# Patient Record
Sex: FEMALE | Race: WHITE | Employment: OTHER | URBAN - METROPOLITAN AREA
[De-identification: names, ages, dates, MRNs, and addresses within clinical notes are randomized per-mention and may not be internally consistent; named-entity substitution may affect disease eponyms.]

---

## 2024-07-17 NOTE — PROGRESS NOTES
Urogynecology  Provider:  Miladys De Souza MD  [phone]      ASSESSMENT AND PLAN:   69 year old female with stage 3-4 anterior wall predominant uterovaginal prolapse. Comorbidities include: Mohs defect.     Diagnoses:  #1 Uterovaginal prolapse with predominant cystocele, stage 3-4  #2 Incomplete bladder emptying secondary to pelvic organ prolapse    Plan:  1. Uterovaginal prolapse with predominant cystocele, stage 3-4  - She has had POP over the past x5 years and was previously fitted with a pessary by another provider that fell out. Upon pelvic exam today Pop-Q demonstrated Aa: +6, C: +6, TVL: 10, and D: 0.   - Reviewed risk factors, natural history and etiology of prolapse.   - Discussed management options for prolapse including expectant management, PFPT, pessary fitting, and surgery.   - We discussed that PFPT would help strengthen the PF muscles with an overall goal to help improve the symptoms of the vaginal bulge and prevent POP from worsening over time.   - We discussed the benefits of fitting her with a pessary which is a small flexible silicone ring that is placed intravaginally to help support the pelvic organs to prevent the symptomatic vaginal bulge. There is no increased risk of UTI or vaginal infections with using a pessary. She may learn how to manage the pessary at home on her own with taking the pessary in/out prior to intercourse and she may return to clinic ever 3 months for us to perform her pessary maintenance.   - We discussed there are different POP repair surgical approaches to consider given that she wishes to maintain the ability to be sexually active in the future such as robotic assisted abdominal surgery that involves placing polypropylene mesh (i.e. SCP + ENRIQUETA) or a native tissue repair vaginal approach that does not involve mesh but rather uses sutures with the intent of the body to scar over to suspend the pelvic organs that can be performed with or without a hysterectomy (i.e.  USLS +/- TVH and APR). If she has no preference in surgery type she may consider enrolling in the Premier trial. We would defer GONZÁLEZ preventative procedure at the time of surgery as she has urinary retention at baseline. For either surgery, it involves 6 weeks of postoperative pelvic rest and no heavy lifting > 5-10 pounds.   - She patient is interested in pursuing a pessary fitting but will consider POP repair in the future.   - Fitted her with a #2 3/4 LS Gellhorn pessary that remained in place with valsalva/bending over and was comfortable. She will return to clinic every 3 months for pessary checks.     - Prolonged conversation with pt and  today to discuss my findings and options for surgery including TVH/USLS or ENRIQUETA/SCPXY or colocleisis. She would like to be sexually active so colpocleisis not appropriate. For now will proceed with pessary but will consider surgery in future. Has never had surgery so anxious about this.    2. Incomplete bladder emptying, urinary retention   - PVR = 121mL by bladder U/S after pessary was fitted today. Of note, prior to fitting her with a pessary her PVR was >300mL.     Follow up in 2 weeks to see Kimberlee BATES-CNP @ [facility] for a newly fitted pessary check and then q3 months pessary checks.     Scribe Attestation  By signing my name below, I, Javed Nath, Evert[employer], attest that this documentation has been prepared under the direction and in the presence of Miladys De Souza MD on 07/18/2024 at 7:09 PM.     Agree with above. I Dr. De Souza, personally performed the services described in the documentation which was scribed virtually and confirm it is both complete and accurate.  Miladys De Souza MD      Problem List Items Addressed This Visit    None          I spent a total of 55 minutes in face to face and non face to face time.      Miladys De Souza MD    Sent in consultation by Dr. Navarro. PVR after pessary placed = 121 mL      HISTORY OF PRESENT ILLNESS:    69 year old female presenting as a new patient referral from Dr. Navarro for evaluation of vaginal prolapse.     Prolapse Symptoms:  - She reports having POP and was fitted with a pessary x5 years ago but the pessary fell out after having a BM in which she could not retreive her pessary.   - Her vaginal prolapse comes past the introitus and is tennis ball sized.      Urinary Symptoms:   - The patient endorses urinary frequency due to the large size of her POP causing incomplete bladder emptying.   - Does have mild and rare GONZÁLEZ leakage at baseline with hard coughing or hard sneezing.      OBGYN History and Sexual Activity:   -  x4   - She is not currently sexually active and does wish to return to intercourse in the future.   - No prior hx of abnormal pap smears.        Past Medical History:     No past medical history on file.       Past Surgical History:     No past surgical history on file.      Medications:     Prior to Admission medications    Not on File        ROS  Review of Systems   Constitutional: Negative.    HENT: Negative.     Eyes: Negative.    Respiratory: Negative.     Cardiovascular: Negative.    Gastrointestinal: Negative.    Endocrine: Negative.    Genitourinary: Negative.    Musculoskeletal: Negative.    Neurological: Negative.    Psychiatric/Behavioral: Negative.     All other systems reviewed and are negative.       PHYSICAL EXAM:    There were no vitals taken for this visit.  No LMP recorded.      Declines chaperone for physical exam.    PVR = 121mL by US after fitting her with a pessary.     Well developed, well nourished, in no apparent distress.   Neurologic/Psychiatric:  Awake, Alert and Oriented times 3.  Affect normal. Normal cranial nerves  Pulm: breathing without effort  Sexual maturity: Jim stage V  Abd exam: soft, non-tender      GENITAL/URINARY:     External Genitalia:  The patient has normal appearing external genitalia, normal skenes and bartholins glands, and a normal  hair distribution.  Her vulva is without lesions, erythema or discharge.  It is non-tender with appropriate sensation.     Urethral Meatus:  Size normal, Location normal, Lesions absent, Prolapse absent.    Urethra:  Fullness absent, Masses absent.    Bladder:  Fullness absent, Masses absent, Tenderness absent.    Vagina:  General appearance normal, Discharge absent, Lesions absent. Normal vaginal epithelium, hypoestrogenic.      Cervix: Normal, no discharge.   Uterus:  normal size, mobile, and nontender  Adnexa:   no masses or tenderness over the B/L adnexa    Anus/Perineum:  Lesions absent and Masses absent. Normal anus and perineum.      Stress urinary incontinence not demonstrable.       POP-Q  The patient has Stage 3-4 Prolapse.    POP-Q:  Stage: 3-4  Position: standing    Aa: +6       Ba: +6 C: +6   Gh:  Pb:  TVL: 10         Ap: -1 Bp:  D: 0           Pt fitted with a 2 3/4 long stem gelhorn      She does not have myofascial tenderness on exam.   Her highest pain score on exam is 1        Rectal exam: No rectovaginal masses or nodularity upon NUNU. Normal resting tone.        Miladys De Souza MD

## 2024-07-18 ENCOUNTER — OFFICE VISIT (OUTPATIENT)
Dept: OBSTETRICS AND GYNECOLOGY | Facility: CLINIC | Age: 69
End: 2024-07-18
Payer: MEDICARE

## 2024-07-18 VITALS
HEIGHT: 68 IN | HEART RATE: 85 BPM | DIASTOLIC BLOOD PRESSURE: 87 MMHG | SYSTOLIC BLOOD PRESSURE: 136 MMHG | BODY MASS INDEX: 28.4 KG/M2 | WEIGHT: 187.4 LBS

## 2024-07-18 DIAGNOSIS — N81.4 CYSTOCELE WITH UTERINE PROLAPSE: Primary | ICD-10-CM

## 2024-07-18 DIAGNOSIS — R33.9 URINARY RETENTION WITH INCOMPLETE BLADDER EMPTYING: ICD-10-CM

## 2024-07-18 LAB
POC APPEARANCE, URINE: CLEAR
POC BILIRUBIN, URINE: NEGATIVE
POC BLOOD, URINE: NEGATIVE
POC COLOR, URINE: YELLOW
POC GLUCOSE, URINE: NEGATIVE MG/DL
POC KETONES, URINE: NEGATIVE MG/DL
POC LEUKOCYTES, URINE: NEGATIVE
POC NITRITE,URINE: NEGATIVE
POC PH, URINE: 6.5 PH
POC PROTEIN, URINE: NEGATIVE MG/DL
POC SPECIFIC GRAVITY, URINE: 1.01
POC UROBILINOGEN, URINE: 0.2 EU/DL

## 2024-07-18 PROCEDURE — 57160 INSERT PESSARY/OTHER DEVICE: CPT | Performed by: OBSTETRICS & GYNECOLOGY

## 2024-07-18 PROCEDURE — 51798 US URINE CAPACITY MEASURE: CPT | Performed by: OBSTETRICS & GYNECOLOGY

## 2024-07-18 PROCEDURE — 1159F MED LIST DOCD IN RCRD: CPT | Performed by: OBSTETRICS & GYNECOLOGY

## 2024-07-18 PROCEDURE — 1160F RVW MEDS BY RX/DR IN RCRD: CPT | Performed by: OBSTETRICS & GYNECOLOGY

## 2024-07-18 PROCEDURE — 1126F AMNT PAIN NOTED NONE PRSNT: CPT | Performed by: OBSTETRICS & GYNECOLOGY

## 2024-07-18 PROCEDURE — 81003 URINALYSIS AUTO W/O SCOPE: CPT | Mod: QW | Performed by: OBSTETRICS & GYNECOLOGY

## 2024-07-18 PROCEDURE — 99214 OFFICE O/P EST MOD 30 MIN: CPT | Performed by: OBSTETRICS & GYNECOLOGY

## 2024-07-18 PROCEDURE — 99204 OFFICE O/P NEW MOD 45 MIN: CPT | Performed by: OBSTETRICS & GYNECOLOGY

## 2024-07-18 PROCEDURE — A4562 PESSARY, NON RUBBER,ANY TYPE: HCPCS | Performed by: OBSTETRICS & GYNECOLOGY

## 2024-07-18 ASSESSMENT — ENCOUNTER SYMPTOMS
NEUROLOGICAL NEGATIVE: 1
CARDIOVASCULAR NEGATIVE: 1
MUSCULOSKELETAL NEGATIVE: 1
PSYCHIATRIC NEGATIVE: 1
EYES NEGATIVE: 1
RESPIRATORY NEGATIVE: 1
GASTROINTESTINAL NEGATIVE: 1
ENDOCRINE NEGATIVE: 1
CONSTITUTIONAL NEGATIVE: 1

## 2024-07-18 ASSESSMENT — PAIN SCALES - GENERAL: PAINLEVEL: 0-NO PAIN

## 2024-07-18 NOTE — LETTER
July 19, 2024     Antionette Navarro MD  [address]  [place] [facility] [id number]    Patient: Gina Case   YOB: 1955   Date of Visit: 7/18/2024       Dear Dr. Antionette Navarro MD:    Thank you for referring Gina Case to me for evaluation. Below are my notes for this consultation.  If you have questions, please do not hesitate to call me. I look forward to following your patient along with you.       Sincerely,     Miladys De Souza MD      CC: No Recipients  ______________________________________________________________________________________    Urogynecology  Provider:  Miladys De Souza MD  [phone]      ASSESSMENT AND PLAN:   69 year old female with stage 3-4 anterior wall predominant uterovaginal prolapse. Comorbidities include: Mohs defect.     Diagnoses:  #1 Uterovaginal prolapse with predominant cystocele, stage 3-4  #2 Incomplete bladder emptying secondary to pelvic organ prolapse    Plan:  1. Uterovaginal prolapse with predominant cystocele, stage 3-4  - She has had POP over the past x5 years and was previously fitted with a pessary by another provider that fell out. Upon pelvic exam today Pop-Q demonstrated Aa: +6, C: +6, TVL: 10, and D: 0.   - Reviewed risk factors, natural history and etiology of prolapse.   - Discussed management options for prolapse including expectant management, PFPT, pessary fitting, and surgery.   - We discussed that PFPT would help strengthen the PF muscles with an overall goal to help improve the symptoms of the vaginal bulge and prevent POP from worsening over time.   - We discussed the benefits of fitting her with a pessary which is a small flexible silicone ring that is placed intravaginally to help support the pelvic organs to prevent the symptomatic vaginal bulge. There is no increased risk of UTI or vaginal infections with using a pessary. She may learn how to manage the pessary at home on her own with taking the pessary  in/out prior to intercourse and she may return to clinic ever 3 months for us to perform her pessary maintenance.   - We discussed there are different POP repair surgical approaches to consider given that she wishes to maintain the ability to be sexually active in the future such as robotic assisted abdominal surgery that involves placing polypropylene mesh (i.e. SCP + ENRIQUETA) or a native tissue repair vaginal approach that does not involve mesh but rather uses sutures with the intent of the body to scar over to suspend the pelvic organs that can be performed with or without a hysterectomy (i.e. USLS +/- TVH and APR). If she has no preference in surgery type she may consider enrolling in the Premier trial. We would defer GONZÁLEZ preventative procedure at the time of surgery as she has urinary retention at baseline. For either surgery, it involves 6 weeks of postoperative pelvic rest and no heavy lifting > 5-10 pounds.   - She patient is interested in pursuing a pessary fitting but will consider POP repair in the future.   - Fitted her with a #2 3/4 LS Gellhorn pessary that remained in place with valsalva/bending over and was comfortable. She will return to clinic every 3 months for pessary checks.     - Prolonged conversation with pt and  today to discuss my findings and options for surgery including TVH/USLS or ENRIQUETA/SCPXY or colocleisis. She would like to be sexually active so colpocleisis not appropriate. For now will proceed with pessary but will consider surgery in future. Has never had surgery so anxious about this.    2. Incomplete bladder emptying, urinary retention   - PVR = 121mL by bladder U/S after pessary was fitted today. Of note, prior to fitting her with a pessary her PVR was >300mL.     Follow up in 2 weeks to see Kimberlee BATES-CNP @ [facility] for a newly fitted pessary check and then q3 months pessary checks.     Scribe Attestation  By signing my name below, IJaved Scribe, americaest  that this documentation has been prepared under the direction and in the presence of Miladys De Souza MD on 2024 at 7:09 PM.     Agree with above. I Dr. De Souza, personally performed the services described in the documentation which was scribed virtually and confirm it is both complete and accurate.  Miladys De Souza MD      Problem List Items Addressed This Visit    None          I spent a total of 55 minutes in face to face and non face to face time.      Miladys De Souza MD    Sent in consultation by Dr. Navarro. PVR after pessary placed = 121 mL      HISTORY OF PRESENT ILLNESS:   69 year old female presenting as a new patient referral from Dr. Navarro for evaluation of vaginal prolapse.     Prolapse Symptoms:  - She reports having POP and was fitted with a pessary x5 years ago but the pessary fell out after having a BM in which she could not retreive her pessary.   - Her vaginal prolapse comes past the introitus and is tennis ball sized.      Urinary Symptoms:   - The patient endorses urinary frequency due to the large size of her POP causing incomplete bladder emptying.   - Does have mild and rare GONZÁLEZ leakage at baseline with hard coughing or hard sneezing.      OBGYN History and Sexual Activity:   -  x4   - She is not currently sexually active and does wish to return to intercourse in the future.   - No prior hx of abnormal pap smears.        Past Medical History:     No past medical history on file.       Past Surgical History:     No past surgical history on file.      Medications:     Prior to Admission medications    Not on File        ROS  Review of Systems   Constitutional: Negative.    HENT: Negative.     Eyes: Negative.    Respiratory: Negative.     Cardiovascular: Negative.    Gastrointestinal: Negative.    Endocrine: Negative.    Genitourinary: Negative.    Musculoskeletal: Negative.    Neurological: Negative.    Psychiatric/Behavioral: Negative.     All other systems reviewed  and are negative.       PHYSICAL EXAM:    There were no vitals taken for this visit.  No LMP recorded.      Declines chaperone for physical exam.    PVR = 121mL by US after fitting her with a pessary.     Well developed, well nourished, in no apparent distress.   Neurologic/Psychiatric:  Awake, Alert and Oriented times 3.  Affect normal. Normal cranial nerves  Pulm: breathing without effort  Sexual maturity: Jim stage V  Abd exam: soft, non-tender      GENITAL/URINARY:     External Genitalia:  The patient has normal appearing external genitalia, normal skenes and bartholins glands, and a normal hair distribution.  Her vulva is without lesions, erythema or discharge.  It is non-tender with appropriate sensation.     Urethral Meatus:  Size normal, Location normal, Lesions absent, Prolapse absent.    Urethra:  Fullness absent, Masses absent.    Bladder:  Fullness absent, Masses absent, Tenderness absent.    Vagina:  General appearance normal, Discharge absent, Lesions absent. Normal vaginal epithelium, hypoestrogenic.      Cervix: Normal, no discharge.   Uterus:  normal size, mobile, and nontender  Adnexa:   no masses or tenderness over the B/L adnexa    Anus/Perineum:  Lesions absent and Masses absent. Normal anus and perineum.      Stress urinary incontinence not demonstrable.       POP-Q  The patient has Stage 3-4 Prolapse.    POP-Q:  Stage: 3-4  Position: standing    Aa: +6       Ba: +6 C: +6   Gh:  Pb:  TVL: 10         Ap: -1 Bp:  D: 0           Pt fitted with a 2 3/4 long stem gelhorn      She does not have myofascial tenderness on exam.   Her highest pain score on exam is 1        Rectal exam: No rectovaginal masses or nodularity upon NUNU. Normal resting tone.        Miladys De Souza MD

## 2024-07-30 ENCOUNTER — APPOINTMENT (OUTPATIENT)
Dept: OBSTETRICS AND GYNECOLOGY | Facility: CLINIC | Age: 69
End: 2024-07-30
Payer: MEDICARE

## 2024-07-30 VITALS
HEART RATE: 71 BPM | TEMPERATURE: 98.4 F | BODY MASS INDEX: 29.89 KG/M2 | HEIGHT: 66 IN | RESPIRATION RATE: 16 BRPM | WEIGHT: 186 LBS | OXYGEN SATURATION: 97 %

## 2024-07-30 DIAGNOSIS — Z46.89 PESSARY MAINTENANCE: ICD-10-CM

## 2024-07-30 DIAGNOSIS — R30.0 DYSURIA: Primary | ICD-10-CM

## 2024-07-30 DIAGNOSIS — N81.4 CYSTOCELE WITH UTERINE PROLAPSE: ICD-10-CM

## 2024-07-30 PROCEDURE — 51798 US URINE CAPACITY MEASURE: CPT | Performed by: NURSE PRACTITIONER

## 2024-07-30 PROCEDURE — 99213 OFFICE O/P EST LOW 20 MIN: CPT | Performed by: NURSE PRACTITIONER

## 2024-07-30 RX ORDER — ATORVASTATIN CALCIUM 20 MG/1
20 TABLET, FILM COATED ORAL NIGHTLY
COMMUNITY
Start: 2024-07-11

## 2024-07-30 SDOH — ECONOMIC STABILITY: FOOD INSECURITY: WITHIN THE PAST 12 MONTHS, THE FOOD YOU BOUGHT JUST DIDN'T LAST AND YOU DIDN'T HAVE MONEY TO GET MORE.: NEVER TRUE

## 2024-07-30 SDOH — ECONOMIC STABILITY: FOOD INSECURITY: WITHIN THE PAST 12 MONTHS, YOU WORRIED THAT YOUR FOOD WOULD RUN OUT BEFORE YOU GOT MONEY TO BUY MORE.: NEVER TRUE

## 2024-07-30 ASSESSMENT — PATIENT HEALTH QUESTIONNAIRE - PHQ9
SUM OF ALL RESPONSES TO PHQ9 QUESTIONS 1 AND 2: 0
2. FEELING DOWN, DEPRESSED OR HOPELESS: NOT AT ALL
1. LITTLE INTEREST OR PLEASURE IN DOING THINGS: NOT AT ALL

## 2024-07-30 ASSESSMENT — LIFESTYLE VARIABLES
HOW MANY STANDARD DRINKS CONTAINING ALCOHOL DO YOU HAVE ON A TYPICAL DAY: 1 OR 2
HOW OFTEN DO YOU HAVE A DRINK CONTAINING ALCOHOL: MONTHLY OR LESS
AUDIT-C TOTAL SCORE: 1
SKIP TO QUESTIONS 9-10: 1
HOW OFTEN DO YOU HAVE SIX OR MORE DRINKS ON ONE OCCASION: NEVER

## 2024-07-30 ASSESSMENT — COLUMBIA-SUICIDE SEVERITY RATING SCALE - C-SSRS
1. IN THE PAST MONTH, HAVE YOU WISHED YOU WERE DEAD OR WISHED YOU COULD GO TO SLEEP AND NOT WAKE UP?: NO
6. HAVE YOU EVER DONE ANYTHING, STARTED TO DO ANYTHING, OR PREPARED TO DO ANYTHING TO END YOUR LIFE?: NO
2. HAVE YOU ACTUALLY HAD ANY THOUGHTS OF KILLING YOURSELF?: NO

## 2024-07-30 ASSESSMENT — PAIN SCALES - GENERAL: PAINLEVEL: 0-NO PAIN

## 2024-07-30 ASSESSMENT — ENCOUNTER SYMPTOMS
OCCASIONAL FEELINGS OF UNSTEADINESS: 0
DEPRESSION: 0
LOSS OF SENSATION IN FEET: 0

## 2024-07-30 NOTE — PROGRESS NOTES
Initial Pessary Check  This is a 69 y.o. with a #2 3/4 LS Gellhorn pessary in place for stage 3-4 uterovaginal prolapse, here for a routine pessary check.    Date of placement: 7/18/2024 - Upon pelvic exam Pop-Q demonstrated Aa: +6, C: +6, TVL: 10, and D: 0. Dr. De Souza fitted her with a LS Gellhorn pessary for stage 3-4 uterovaginal prolapse.     Today she reports:  Pessary comfortable: Yes  Vaginal bleeding: No  Abnormal vaginal discharge: No  Using vaginal estrogen: No    Prolapse Symptoms:  - She feels that the pessary is comfortable and has reduced her POP while improving her ability to void. However, she is considering a POP repair surgery with Dr. De Souza in the future as she is hesitant to continue pessary checks every 3 months moving forward.   - Denies experiencing GONZÁLEZ with coughing, laughing, and sneezing at baseline.   - Does endorse rare urinary urgency with feeling the need to rush to the bathroom occasionally.   - The patient is not sexually active due to her POP and having a Gellhorn pessary in place. However, she wishes to maintain the ability to have intercourse in the future so she is not a candidate for colpocleisis.       Exam:  Physical Exam  Constitutional:       General: She is not in acute distress.     Appearance: Normal appearance.   Genitourinary:      Bladder and urethral meatus normal.      No vaginal discharge, bleeding or granulation tissue.      Anterior and apical vaginal prolapse present.     Mild vaginal atrophy present.     No cervical motion tenderness.      Uterus is not enlarged, fixed or tender.      No uterine mass detected.     No urethral tenderness or mass present.   POP-Q measurements were:      Aa: +6, Ba: +6, C: +6     gH: pB: TVL: 10     Ap: Bp: D: 0  HENT:      Head: Normocephalic and atraumatic.   Pulmonary:      Effort: Pulmonary effort is normal.   Psychiatric:         Mood and Affect: Mood normal.         Behavior: Behavior normal.         Thought Content:  Thought content normal.         Judgment: Judgment normal.        Procedure:   Pessary position on presentation: Normal  Pessary removed and cleaned without difficulty  Speculum exam: Vaginal mucosa was examined for the presence of irritation, trauma and/or bleeding   Erosions: No   Vaginal atrophy: Yes   Silver nitrate applied :No  Pessary replaced without difficulty.    Assessment/Plan:  69 year old female with stage 3-4 anterior wall predominant uterovaginal prolapse. Comorbidities include: Mohs defect.      Diagnoses:  #1 Uterovaginal prolapse with predominant cystocele, stage 3-4  #2 Incomplete bladder emptying secondary to pelvic organ prolapse     Plan:  1. Uterovaginal prolapse with predominant cystocele, stage 3-4  - The #2 3/4 LS Gellhorn pessary is in place and is adequately reducing her POP without any pain, bleeding, or issues. The pessary is overall comfortable to have in place.   - The patient is satisfied with the pessary and plans to continue use but is considering a POP repair (TVH/USLS vs. ENRIQUETA/SCPXY ) in the future with Dr. De Souza.   - Risks, alternative and routine maintenance reviewed with patient to return every 3 months for  routine pessary check visits.   - She is not currently using E2 to prevent erosions from the pessary.     2. Incomplete bladder emptying secondary to POP  - PVR = 27mL by bladder U/S.  - Her urinary retention is now resolved with a pessary in place to help reduce her POP as this was primary attributing factor to her retention issues.     She will return to the office in 3 months for a pessary recheck with TOMMIE Moreno or sooner should she have any problems.    All questions and concerns were answered and addressed.    Scribe Attestation  By signing my name below, Javed PEÑA Scribe, attest that this documentation has been prepared under the direction and in the presence of TOMMIE Moreno on 07/30/2024 at 11:54 AM.

## 2024-09-19 ENCOUNTER — TELEPHONE (OUTPATIENT)
Dept: OBSTETRICS AND GYNECOLOGY | Facility: CLINIC | Age: 69
End: 2024-09-19
Payer: MEDICARE

## 2024-09-19 NOTE — TELEPHONE ENCOUNTER
Pt contacted.    Discussed need for sooner appt.    Pt states she already called this morning and got in next week to see chapin at [facility]

## 2024-09-24 ENCOUNTER — PROCEDURE VISIT (OUTPATIENT)
Dept: OBSTETRICS AND GYNECOLOGY | Facility: CLINIC | Age: 69
End: 2024-09-24
Payer: MEDICARE

## 2024-09-24 VITALS
BODY MASS INDEX: 29.89 KG/M2 | RESPIRATION RATE: 16 BRPM | SYSTOLIC BLOOD PRESSURE: 138 MMHG | TEMPERATURE: 98.3 F | HEIGHT: 66 IN | WEIGHT: 186 LBS | OXYGEN SATURATION: 98 % | DIASTOLIC BLOOD PRESSURE: 88 MMHG | HEART RATE: 76 BPM

## 2024-09-24 DIAGNOSIS — Z46.89 PESSARY MAINTENANCE: ICD-10-CM

## 2024-09-24 DIAGNOSIS — N95.2 VAGINAL ATROPHY: ICD-10-CM

## 2024-09-24 DIAGNOSIS — N89.8: ICD-10-CM

## 2024-09-24 DIAGNOSIS — N81.4 CYSTOCELE WITH UTERINE PROLAPSE: Primary | ICD-10-CM

## 2024-09-24 PROCEDURE — 99213 OFFICE O/P EST LOW 20 MIN: CPT | Performed by: NURSE PRACTITIONER

## 2024-09-24 RX ORDER — POLYETHYLENE GLYCOL-3350 AND ELECTROLYTES 236; 6.74; 5.86; 2.97; 22.74 G/274.31G; G/274.31G; G/274.31G; G/274.31G; G/274.31G
POWDER, FOR SOLUTION ORAL
COMMUNITY
Start: 2024-09-19

## 2024-09-24 RX ORDER — VITAMIN E 268 MG
500 CAPSULE ORAL
COMMUNITY
Start: 2023-01-17

## 2024-09-24 RX ORDER — CHOLECALCIFEROL (VITAMIN D3) 25 MCG
25 TABLET ORAL
COMMUNITY
Start: 2023-01-17

## 2024-09-24 ASSESSMENT — ENCOUNTER SYMPTOMS
DEPRESSION: 0
LOSS OF SENSATION IN FEET: 0
OCCASIONAL FEELINGS OF UNSTEADINESS: 0

## 2024-09-24 ASSESSMENT — PAIN SCALES - GENERAL: PAINLEVEL: 0-NO PAIN

## 2024-09-24 NOTE — PROGRESS NOTES
Pessary Check  This is a 69 y.o. with a #2 3/4 LS Gellhorn pessary in place for stage 3-4 uterovaginal prolapse, here for a routine pessary check.     Date of last check: 7/30/2024    Today she reports:  Pessary comfortable: Yes  Vaginal bleeding: No  Abnormal vaginal discharge: Yes  Using vaginal estrogen: No    Pelvic Symptoms:  - She recently had colitis and began experiencing vaginal pressure related to her pessary.   - The patient feels she might have an erosion from the pessary due to experiencing abnormal vaginal discharge.   - She reports feeling that the pessary seems to have shifted position over the the L side of her vagina instead of at the midline.  The patient did not feel the pessary pressure or bleeding initially after she was fitted with the 2 3/4 LS Gellhorn pessary.   - Recently completed taking Flagyl and Ciprofloxacin for colitis - this has resolved her vaginal discharge  - Discontinued using E2 cream since she was fitted with a pessary.   - She continues to endorse pelvic pressure, mainly left-sided  - Patient reports seeing the prolapse bulging around the pessary when looking with mirror    Exam:  Physical Exam  Constitutional:       General: She is not in acute distress.     Appearance: Normal appearance.   Genitourinary:      No vaginal bleeding, ulceration or granulation tissue.      Anterior and apical vaginal prolapse present.     Mild vaginal atrophy present.     Uterus is not enlarged, fixed or tender.      No uterine mass detected.     Urethral meatus caruncle present.     No urethral tenderness or mass present.   POP-Q measurements were:      Aa: Ba: C: -3     gH: pB: TVL:      Ap: Bp: D: -1.5  HENT:      Head: Normocephalic and atraumatic.   Pulmonary:      Effort: Pulmonary effort is normal.   Psychiatric:         Mood and Affect: Mood normal.         Behavior: Behavior normal.         Thought Content: Thought content normal.         Judgment: Judgment normal.     Procedure:    Pessary position on presentation: Normal  Pessary removed and cleaned without difficulty  Speculum exam: Vaginal mucosa was examined for the presence of irritation, trauma and/or bleeding   Erosions: No   Vaginal atrophy: Yes   Silver nitrate applied: No  Pessary replaced without difficulty.    Assessment/Plan:  69 year old female with stage 2-3 anterior wall predominant uterovaginal prolapse presenting for a pessary check visit.    Diagnoses:  #1 Uterovaginal prolapse with cystocele, stage 2-3  #2 Pessary maintenance     Plan:  1. Uterovaginal prolapse, pessary management   - The #2 3/4 LS Gellhorn pessary was removed. Speculum exam did not reveal any areas of active bleeding, erosions, or granulation tissue. Pessary was cleaned and replaced back.   > Defer fitting her with a differed sized pessary as her pessary was in the correct position upon exam today which was reassuring to the patient. Her abdominal and pelvic pressure might be due to having colitis recently. There is no evidence of erosions or vaginal infection that would associated with her pelvic symptoms.   > Reassured her that the current pessary size of 2 3/4 LS Gellhorn is the appropriate fit for her at this time and we do not need to size up or down as the prolapse bulges past the pessary but she is not bothered by these symptoms. She understands and is amenable to keeping her current pessary size.   - If she wishes to discuss a POP repair after her colonoscopy we can discuss at next visit. However, at this time she is satisfied with the pessary and plans to continue use.   - Risks, alternative and routine maintenance reviewed with patient.    2. Vaginal atrophy, urethral caruncle   - We advised her to restart using transvaginal Premarin cream 2x/week to help reduce GSM dryness and to reduce the size of her urethral caruncle.     She will return to the office in 3 months for a  pessary recheck or sooner should she have any problems.    All  questions and concerns were answered and addressed.    Scribe Attestation  By signing my name below, IJaved Scribe, attest that this documentation has been prepared under the direction and in the presence of TOMMIE Moreno on 09/24/2024 at 1:07 PM.     I, Kimberlee Bridges, personally performed the services described in the documentation as scribed in my presence and confirm it is both complete and accurate.

## 2024-10-15 ENCOUNTER — APPOINTMENT (OUTPATIENT)
Dept: OBSTETRICS AND GYNECOLOGY | Facility: CLINIC | Age: 69
End: 2024-10-15
Payer: MEDICARE

## 2024-10-22 ENCOUNTER — TELEPHONE (OUTPATIENT)
Dept: OBSTETRICS AND GYNECOLOGY | Facility: CLINIC | Age: 69
End: 2024-10-22
Payer: MEDICARE

## 2024-10-22 NOTE — TELEPHONE ENCOUNTER
Pt contacted.    Pt informed [place] the airport.   Reassurance provided.

## 2024-12-16 ENCOUNTER — OFFICE VISIT (OUTPATIENT)
Dept: OBSTETRICS AND GYNECOLOGY | Facility: CLINIC | Age: 69
End: 2024-12-16
Payer: MEDICARE

## 2024-12-16 VITALS
HEART RATE: 73 BPM | DIASTOLIC BLOOD PRESSURE: 80 MMHG | BODY MASS INDEX: 30.87 KG/M2 | WEIGHT: 188.4 LBS | SYSTOLIC BLOOD PRESSURE: 137 MMHG

## 2024-12-16 DIAGNOSIS — N39.9 URINARY DISORDER: Primary | ICD-10-CM

## 2024-12-16 DIAGNOSIS — N83.201 RIGHT OVARIAN CYST: ICD-10-CM

## 2024-12-16 LAB
POC APPEARANCE, URINE: CLEAR
POC BILIRUBIN, URINE: NEGATIVE
POC BLOOD, URINE: ABNORMAL
POC COLOR, URINE: YELLOW
POC GLUCOSE, URINE: NEGATIVE MG/DL
POC KETONES, URINE: NEGATIVE MG/DL
POC LEUKOCYTES, URINE: ABNORMAL
POC NITRITE,URINE: NEGATIVE
POC PH, URINE: 5.5 PH
POC PROTEIN, URINE: NEGATIVE MG/DL
POC SPECIFIC GRAVITY, URINE: 1.02
POC UROBILINOGEN, URINE: 0.2 EU/DL

## 2024-12-16 PROCEDURE — 1126F AMNT PAIN NOTED NONE PRSNT: CPT | Performed by: OBSTETRICS & GYNECOLOGY

## 2024-12-16 PROCEDURE — 81003 URINALYSIS AUTO W/O SCOPE: CPT | Mod: QW | Performed by: OBSTETRICS & GYNECOLOGY

## 2024-12-16 PROCEDURE — 99214 OFFICE O/P EST MOD 30 MIN: CPT | Performed by: OBSTETRICS & GYNECOLOGY

## 2024-12-16 ASSESSMENT — ENCOUNTER SYMPTOMS
ENDOCRINE NEGATIVE: 1
MUSCULOSKELETAL NEGATIVE: 1
NEUROLOGICAL NEGATIVE: 1
PSYCHIATRIC NEGATIVE: 1
CONSTITUTIONAL NEGATIVE: 1
CARDIOVASCULAR NEGATIVE: 1
RESPIRATORY NEGATIVE: 1
EYES NEGATIVE: 1

## 2024-12-16 ASSESSMENT — PAIN SCALES - GENERAL: PAINLEVEL_OUTOF10: 0-NO PAIN

## 2024-12-16 NOTE — PROGRESS NOTES
Urogynecology  Provider:  Miladys De Souza MD  [phone]              ASSESSMENT AND PLAN:   69-year-old female being assessed for uterovaginal prolapse, ovarian cyst, and vaginal atrophy.    Diagnoses:  #1 Uterovaginal prolapse  #2 Ovarian cyst  #3 Vaginal atrophy  #4 Pessary maintenance    Plan:  Uterovaginal prolapse, incomplete bladder emptying, pessary maintenance  - She reports incomplete bladder emptying, which may be related to the prolapse.  - She is currently using a #2 3/4 LS Gellhorn pessary, which is functioning adequately.  - She is considering surgical options, including a vaginal hysterectomy or robotic surgery, as part of a study comparing the two methods. She is informed about the pros and cons of both options, including the use of mesh in robotic surgery and the small potential for recurrence.  - #2 3/4 pessary was removed. Speculum exam did not reveal any areas of active bleeding, erosions, or granulation tissue. Pessary was cleaned and replaced back.  - Significant time spent with pt discussing pros and cons of pursuing definitive surgical management of her POP. She is clearly undecided and needs to think about this more.  - Also reviewed possible surgical options in detail. She had previously declined a colpocleisis.  - Discussed PREMIER trial.    2. Ovarian cyst  - She has a stable 2.7 x 3.8 cm ovarian cyst identified on a CT scan in December. The cyst is being monitored for changes.  - Will order repeat U/S to assess the cyst's status.  - If the cyst enlarges, consider surgical intervention, which may coincide with prolapse surgery.    3. Vaginal atrophy  - She has low estrogen levels affecting the urethra and a small urethral caruncle.  - Continue using tv estrogen twice weekly.    Follow-up with Kimberlee Bridges in March for pessary check and in late April for U/S results and further discussion on surgical options.    Scribe Attestation  By signing my name below, IJared,  Scribe, attest that this documentation has been prepared under the direction and in the presence of Miladys De Souza MD on 12/16/2024 at 5:47 PM.    Agree with above. I Dr. De Souza, personally performed the services described in the documentation which was scribed virtually and confirm it is both complete and accurate.  Miladys De Souza MD      Problem List Items Addressed This Visit    None          I spent a total of eConsult Time: 30 minutes in face to face and non face to face time.        Miladys De Souza MD        HISTORY OF PRESENT ILLNESS:     Last visit 9/2024 with CS    Diagnoses:  #1 Uterovaginal prolapse with cystocele, stage 2-3  #2 Pessary maintenance      Plan:  1. Uterovaginal prolapse, pessary management   - The #2 3/4 LS Gellhorn pessary was removed. Speculum exam did not reveal any areas of active bleeding, erosions, or granulation tissue. Pessary was cleaned and replaced back.   > Defer fitting her with a differed sized pessary as her pessary was in the correct position upon exam today which was reassuring to the patient. Her abdominal and pelvic pressure might be due to having colitis recently. There is no evidence of erosions or vaginal infection that would associated with her pelvic symptoms.   > Reassured her that the current pessary size of 2 3/4 LS Gellhorn is the appropriate fit for her at this time and we do not need to size up or down as the prolapse bulges past the pessary but she is not bothered by these symptoms. She understands and is amenable to keeping her current pessary size.   - If she wishes to discuss a POP repair after her colonoscopy we can discuss at next visit. However, at this time she is satisfied with the pessary and plans to continue use.   - Risks, alternative and routine maintenance reviewed with patient.     2. Vaginal atrophy, urethral caruncle   - We advised her to restart using transvaginal Premarin cream 2x/week to help reduce GSM dryness and to reduce  the size of her urethral caruncle.         Record Review:   - She reports a stable 2.7 x 3.8 cm ovarian cyst on the R side identified during a CT in December.  - She is aware that the cyst is being monitored and is considering future surgical intervention if the cyst enlarges.    Prolapse Symptoms :  - She reports a sensation of heaviness.  - She is currently using a pessary but is considering surgical options for uterine prolapse.  - She expresses interest in maintaining sexual activity, influencing the choice of surgical procedure.  - She is considering a vaginal hysterectomy or robotic surgery.  - She is concerned about recovery time and the impact on daily activities.     Urinary Symptoms:   - She reports incomplete bladder emptying, which may be related to the prolapse.    Bowel Symptoms:   - She reports having to strain with bowel movements.           Past Medical History:      No past medical history on file.       Past Surgical History:       No past surgical history on file.      Medications:       Prior to Admission medications    Medication Sig Start Date End Date Taking? Authorizing Provider   ascorbic acid, vitamin C, 500 mg ER tablet 1 tablet (500 mg). 1/17/23   Historical Provider, MD   atorvastatin (Lipitor) 20 mg tablet Take 1 tablet (20 mg) by mouth once daily at bedtime. 7/11/24   Historical Provider, MD   cholecalciferol (Vitamin D-3) 25 MCG (1000 UT) tablet 1 tablet (25 mcg). 1/17/23   Historical Provider, MD   GaviLyte-G 236-22.74-6.74 -5.86 gram solution MIX AND DRINK 240ML BY MOUTH EVERY 10 MINUTES 9/19/24   Historical Provider, MD MCCORMICK  Review of Systems   Constitutional: Negative.    HENT: Negative.     Eyes: Negative.    Respiratory: Negative.     Cardiovascular: Negative.    Gastrointestinal:         Heaviness/straining with bowel movements   Endocrine: Negative.    Genitourinary:         Stable ovarian cyst   Musculoskeletal: Negative.    Neurological: Negative.   "  Psychiatric/Behavioral: Negative.          POC Blood, Urine   Date Value Ref Range Status   07/18/2024 NEGATIVE NEGATIVE Final     Poc Nitrite, Urine   Date Value Ref Range Status   07/18/2024 NEGATIVE NEGATIVE Final     POC Urobilinogen, Urine   Date Value Ref Range Status   07/18/2024 0.2 0.2, 1.0 EU/DL Final     POC Leukocytes, Urine   Date Value Ref Range Status   07/18/2024 NEGATIVE NEGATIVE Final         PHYSICAL EXAM:      There were no vitals taken for this visit.     No LMP recorded.      Declines chaperone for physical exam.      Well developed, well nourished, in no apparent distress.   Neurologic/Psychiatric:  Awake, Alert and Oriented times 3.  Affect normal.     GENITAL/URINARY:       External Genitalia:  The patient has normal appearing external genitalia, normal skenes and bartholins glands, and a normal hair distribution.  Her vulva is without lesions, erythema or discharge.  It is non-tender with appropriate sensation.     Urethral Meatus:  Size normal, Location normal, Lesions absent, Prolapse absent,      Urethra:  Fullness absent, Masses absent,      Bladder:  Fullness absent, Masses absent, Tenderness absent,      Vagina:  General appearance normal, Estrogen effect normal, Discharge absent, Lesions absent     Cervix: Normal, no discharge.   Uterus:  normal size, mobile, and nontender  Adnexa: normal     Anus/Perineum:  Lesions absent and Masses absent normal sphincter tone, no lesions  No Hemorrhoids, Normal Perineum    Pessary removed. On spec exam no erosions.  Gelhorn pessary cleaned and replaced without issue.      Data and DIAGNOSTIC STUDIES REVIEWED   No results found.   No results found for: \"URINECULTURE\", \"UAMICCOMM\"   No results found for: \"GLUCOSE\", \"CALCIUM\", \"NA\", \"K\", \"CO2\", \"CL\", \"BUN\", \"CREATININE\"No results found for: \"WBC\", \"HGB\", \"HCT\", \"MCV\", \"PLT\"       Milayds De Souza MD        "

## 2025-01-07 ENCOUNTER — APPOINTMENT (OUTPATIENT)
Dept: OBSTETRICS AND GYNECOLOGY | Facility: CLINIC | Age: 70
End: 2025-01-07
Payer: MEDICARE

## 2025-02-04 ENCOUNTER — PROCEDURE VISIT (OUTPATIENT)
Dept: OBSTETRICS AND GYNECOLOGY | Facility: CLINIC | Age: 70
End: 2025-02-04
Payer: MEDICARE

## 2025-02-04 VITALS
HEIGHT: 68 IN | HEART RATE: 73 BPM | WEIGHT: 190.8 LBS | TEMPERATURE: 98.4 F | RESPIRATION RATE: 16 BRPM | BODY MASS INDEX: 28.92 KG/M2 | SYSTOLIC BLOOD PRESSURE: 143 MMHG | DIASTOLIC BLOOD PRESSURE: 89 MMHG | OXYGEN SATURATION: 97 %

## 2025-02-04 DIAGNOSIS — N95.2 VAGINAL ATROPHY: ICD-10-CM

## 2025-02-04 DIAGNOSIS — N81.4 CYSTOCELE WITH UTERINE PROLAPSE: Primary | ICD-10-CM

## 2025-02-04 DIAGNOSIS — Z46.89 PESSARY MAINTENANCE: ICD-10-CM

## 2025-02-04 PROCEDURE — 99213 OFFICE O/P EST LOW 20 MIN: CPT | Performed by: NURSE PRACTITIONER

## 2025-02-04 PROCEDURE — G2211 COMPLEX E/M VISIT ADD ON: HCPCS | Performed by: NURSE PRACTITIONER

## 2025-02-04 RX ORDER — ESTRADIOL 0.1 MG/G
CREAM VAGINAL
Qty: 42.5 G | Refills: 5 | Status: SHIPPED | OUTPATIENT
Start: 2025-02-04

## 2025-02-04 SDOH — ECONOMIC STABILITY: FOOD INSECURITY: WITHIN THE PAST 12 MONTHS, THE FOOD YOU BOUGHT JUST DIDN'T LAST AND YOU DIDN'T HAVE MONEY TO GET MORE.: NEVER TRUE

## 2025-02-04 SDOH — ECONOMIC STABILITY: FOOD INSECURITY: WITHIN THE PAST 12 MONTHS, YOU WORRIED THAT YOUR FOOD WOULD RUN OUT BEFORE YOU GOT MONEY TO BUY MORE.: NEVER TRUE

## 2025-02-04 ASSESSMENT — ENCOUNTER SYMPTOMS
OCCASIONAL FEELINGS OF UNSTEADINESS: 0
DEPRESSION: 0
LOSS OF SENSATION IN FEET: 0

## 2025-02-04 ASSESSMENT — PAIN SCALES - GENERAL: PAINLEVEL_OUTOF10: 0-NO PAIN

## 2025-02-04 NOTE — PROGRESS NOTES
Pessary Check    This is a 69 y.o. with a  #2 3/4 LS Gellhorn pessary here for a routine pessary check. She was having some pressure, which we attributed to her recent colitis. She also was restarted on vaginal estrogen cream, for atrophy and the urethral caruncle. Patient has an order for repeat pelvic U/S to evaluate ovarian cyst in April.     Date of last check: 12/16/24    Today she reports:  Pessary comfortable? She still has some pressure associated with the pessary. She reports she did get a colonoscopy for colitis. Pt has spoken to Dr. De Souza previously about surgery, but did not elect to undergo surgery. She may consider surgery in the future.   Vaginal bleeding:No  Abnormal vaginal discharge:No  Using vaginal estrogen:Yes  Compliant with tv estrogen cream.     Exam:  Physical Exam  Constitutional:       General: She is not in acute distress.     Appearance: Normal appearance. She is normal weight. She is not ill-appearing.   Pulmonary:      Effort: Pulmonary effort is normal.   Neurological:      Mental Status: She is alert.   Psychiatric:         Mood and Affect: Mood normal.         Behavior: Behavior normal.         Thought Content: Thought content normal.         Judgment: Judgment normal.     Procedure:   Pessary position on presentation: Normal  Pessary removed and cleaned without difficulty  Speculum exam: Vaginal mucosa was examined for the presence of irritation, trauma and/or bleeding   Erosions: No   Vaginal atrophy: No   Silver nitrate applied :No  Pessary replaced without difficulty.    Assessment/Plan:  Gina Case is a 69 y.o. being treated for stage 2-3 anterior wall predominant uterovaginal prolapse presenting for a pessary check visit.     The patient is satisfied with the pessary and plans to continue use.   Risks, alternative and routine maintenance reviewed with patient.  Continue low dose vaginal estrogen to help prevent erosions with pessary. Sent refills to preferred  pharmacy.     She will return to the office in 3 months for recheck or sooner should she have any problems.    All questions and concerns were answered and addressed.    Scribe Attestation:   I, Arleth Amador, am scribing for virtually, and in the presence of TOMMIE Moreno on 02/04/2025 at 10:52 AM.     I, Kimberlee Bridges, personally performed the services described in the documentation as scribed in my presence and confirm it is both complete and accurate.

## 2025-03-03 ENCOUNTER — APPOINTMENT (OUTPATIENT)
Dept: OBSTETRICS AND GYNECOLOGY | Facility: CLINIC | Age: 70
End: 2025-03-03
Payer: MEDICARE

## 2025-03-04 ENCOUNTER — OFFICE VISIT (OUTPATIENT)
Dept: OBSTETRICS AND GYNECOLOGY | Facility: CLINIC | Age: 70
End: 2025-03-04
Payer: MEDICARE

## 2025-03-04 VITALS
BODY MASS INDEX: 28.61 KG/M2 | HEIGHT: 68 IN | DIASTOLIC BLOOD PRESSURE: 74 MMHG | TEMPERATURE: 97.2 F | WEIGHT: 188.8 LBS | OXYGEN SATURATION: 97 % | RESPIRATION RATE: 16 BRPM | HEART RATE: 76 BPM | SYSTOLIC BLOOD PRESSURE: 113 MMHG

## 2025-03-04 DIAGNOSIS — N95.2 VAGINAL ATROPHY: ICD-10-CM

## 2025-03-04 DIAGNOSIS — N81.4 CYSTOCELE WITH UTERINE PROLAPSE: Primary | ICD-10-CM

## 2025-03-04 DIAGNOSIS — Z46.89 PESSARY MAINTENANCE: ICD-10-CM

## 2025-03-04 PROCEDURE — 1126F AMNT PAIN NOTED NONE PRSNT: CPT | Performed by: NURSE PRACTITIONER

## 2025-03-04 PROCEDURE — G2211 COMPLEX E/M VISIT ADD ON: HCPCS | Performed by: NURSE PRACTITIONER

## 2025-03-04 PROCEDURE — 1036F TOBACCO NON-USER: CPT | Performed by: NURSE PRACTITIONER

## 2025-03-04 PROCEDURE — 99212 OFFICE O/P EST SF 10 MIN: CPT | Performed by: NURSE PRACTITIONER

## 2025-03-04 PROCEDURE — 3008F BODY MASS INDEX DOCD: CPT | Performed by: NURSE PRACTITIONER

## 2025-03-04 PROCEDURE — 1159F MED LIST DOCD IN RCRD: CPT | Performed by: NURSE PRACTITIONER

## 2025-03-04 PROCEDURE — 1160F RVW MEDS BY RX/DR IN RCRD: CPT | Performed by: NURSE PRACTITIONER

## 2025-03-04 ASSESSMENT — ENCOUNTER SYMPTOMS
LOSS OF SENSATION IN FEET: 0
OCCASIONAL FEELINGS OF UNSTEADINESS: 0
DEPRESSION: 0

## 2025-03-04 ASSESSMENT — PAIN SCALES - GENERAL: PAINLEVEL_OUTOF10: 0-NO PAIN

## 2025-03-04 NOTE — PROGRESS NOTES
Pessary Check    This is a 69 y.o. with a #2 3/4 LS Gellhorn pessary here for a routine pessary check.    Date of last check: 2/4/25     Today she reports:  Pessary comfortable: Yes  Vaginal bleeding:No  Abnormal vaginal discharge:She has noticed some vaginal discharge associated with the pessary.   Using vaginal estrogen:Yes    Interval History:  - The patient is scheduled for an ultrasound later this month to monitor an ovarian cyst.    Exam:  Physical Exam  Constitutional:       General: She is not in acute distress.     Appearance: Normal appearance. She is normal weight. She is not ill-appearing.   Genitourinary:      Vulva normal.      No lesions in the vagina.      No vaginal erythema, ulceration or granulation tissue.   Pulmonary:      Effort: Pulmonary effort is normal.   Neurological:      General: No focal deficit present.      Mental Status: She is alert and oriented to person, place, and time.   Psychiatric:         Mood and Affect: Mood normal.         Behavior: Behavior normal.         Thought Content: Thought content normal.         Judgment: Judgment normal.   Vitals reviewed.     Procedure:   Pessary position on presentation: Normal  Pessary removed and cleaned without difficulty  Speculum exam: Vaginal mucosa was examined for the presence of irritation, trauma and/or bleeding   Erosions: No   Vaginal atrophy: No   Silver nitrate applied :No  Pessary replaced without difficulty.    Assessment/Plan:  Gina Case is a 69 y.o. being treated for stage 2-3 anterior wall predominant uterovaginal prolapse presenting for a pessary check visit.     The patient is satisfied with the pessary and plans to continue use.   Risks, alternative and routine maintenance reviewed with patient.  Continue low dose vaginal estrogen to help prevent erosions with pessary. Sent refills of Premarin to preferred pharmacy.     She will return to the office in June for recheck or sooner should she have any problems. Can  discuss ultrasound results at that visit or sooner with [facility].    All questions and concerns were answered and addressed.    Scribe Attestation:   I, Arleth Amador, am scribing for virtually, and in the presence of TOMMIE Moreno on 03/04/2025 at 11:25 AM.     I, Kimberlee Bridges, personally performed the services described in the documentation as scribed in my presence and confirm it is both complete and accurate.

## 2025-03-04 NOTE — PATIENT INSTRUCTIONS
"To reach the Urogynecology nurses for Dr. Marie and Kimberlee Bridges, call [phone]. You will then select option 2 to be connected to the your provider's office and then option 3 for \"[facility] or [facility]\". This will connect you with Bernadette or Gema Ferguson.   "

## 2025-03-26 ENCOUNTER — HOSPITAL ENCOUNTER (OUTPATIENT)
Dept: RADIOLOGY | Facility: HOSPITAL | Age: 70
Discharge: HOME | End: 2025-03-26
Payer: MEDICARE

## 2025-03-26 DIAGNOSIS — N83.201 RIGHT OVARIAN CYST: ICD-10-CM

## 2025-03-26 PROCEDURE — 76856 US EXAM PELVIC COMPLETE: CPT

## 2025-04-08 ENCOUNTER — APPOINTMENT (OUTPATIENT)
Dept: OBSTETRICS AND GYNECOLOGY | Facility: CLINIC | Age: 70
End: 2025-04-08
Payer: MEDICARE

## 2025-04-14 ENCOUNTER — APPOINTMENT (OUTPATIENT)
Dept: OBSTETRICS AND GYNECOLOGY | Facility: CLINIC | Age: 70
End: 2025-04-14
Payer: MEDICARE

## 2025-05-12 ENCOUNTER — OFFICE VISIT (OUTPATIENT)
Dept: OBSTETRICS AND GYNECOLOGY | Facility: CLINIC | Age: 70
End: 2025-05-12
Payer: MEDICARE

## 2025-05-12 VITALS
SYSTOLIC BLOOD PRESSURE: 138 MMHG | DIASTOLIC BLOOD PRESSURE: 84 MMHG | HEIGHT: 68 IN | BODY MASS INDEX: 29.01 KG/M2 | HEART RATE: 64 BPM | WEIGHT: 191.4 LBS

## 2025-05-12 DIAGNOSIS — N83.209 CYST OF OVARY, UNSPECIFIED LATERALITY: ICD-10-CM

## 2025-05-12 DIAGNOSIS — N39.9 URINARY DISORDER: Primary | ICD-10-CM

## 2025-05-12 PROCEDURE — 99212 OFFICE O/P EST SF 10 MIN: CPT | Mod: 25 | Performed by: OBSTETRICS & GYNECOLOGY

## 2025-05-12 PROCEDURE — 3008F BODY MASS INDEX DOCD: CPT | Performed by: OBSTETRICS & GYNECOLOGY

## 2025-05-12 PROCEDURE — 99212 OFFICE O/P EST SF 10 MIN: CPT | Performed by: OBSTETRICS & GYNECOLOGY

## 2025-05-12 PROCEDURE — 1126F AMNT PAIN NOTED NONE PRSNT: CPT | Performed by: OBSTETRICS & GYNECOLOGY

## 2025-05-12 PROCEDURE — 51798 US URINE CAPACITY MEASURE: CPT | Performed by: OBSTETRICS & GYNECOLOGY

## 2025-05-12 PROCEDURE — 1159F MED LIST DOCD IN RCRD: CPT | Performed by: OBSTETRICS & GYNECOLOGY

## 2025-05-12 ASSESSMENT — PAIN SCALES - GENERAL: PAINLEVEL_OUTOF10: 0-NO PAIN

## 2025-05-12 NOTE — PROGRESS NOTES
Urogynecology  Provider:  Miladys De Souza MD  [phone]    ASSESSMENT AND PLAN:   69 y.o. female with uterovaginal prolapse, ovarian cyst, and vaginal atrophy.     Plan:   1. Ovarian cyst  - 3/26/25 Pelvic U/S: Very limited exam due to lack of transvaginal imaging and presence of a pessary.  - We discussed the need for repeat Pelvic U/S. The patient will come to the office for pessary removal, proceed to the ultrasound, and return for pessary reinsertion.     2. Uterovaginal prolapse  - She has a #2 3/4 LS Gellhorn, and last pessary check was 3/4/25.   - Follow up with Kimberlee Bridges NP in June for a pessary check.   - She is contemplating POP repair surgery, but further imaging is required before proceeding with any surgical planning.     Follow-up with Dr. De Souza after Pelvic U/S.     Scribe Attestation:   I, Arleth Amador, am scribing for virtually, and in the presence of Miladys De Souza MD on 05/12/2025 at 10:37 AM.     Agree with above. I Dr. De Souza, personally performed the services described in the documentation which was scribed virtually and confirm it is both complete and accurate.  Miladys De Souza MD      Problem List Items Addressed This Visit    None          I spent a total of eConsult Time: 15 minutes in face to face and non face to face time.        Miladys De Souza MD        HISTORY OF PRESENT ILLNESS:   Gina Case is a 69 y.o. female who presents for follow up.     Record Review:   Last visit 12/2024  69-year-old female being assessed for uterovaginal prolapse, ovarian cyst, and vaginal atrophy.     Diagnoses:  #1 Uterovaginal prolapse  #2 Ovarian cyst  #3 Vaginal atrophy  #4 Pessary maintenance     Plan:  Uterovaginal prolapse, incomplete bladder emptying, pessary maintenance  - She reports incomplete bladder emptying, which may be related to the prolapse.  - She is currently using a #2 3/4 LS Gellhorn pessary, which is functioning adequately.  - She is considering  surgical options, including a vaginal hysterectomy or robotic surgery, as part of a study comparing the two methods. She is informed about the pros and cons of both options, including the use of mesh in robotic surgery and the small potential for recurrence.  - #2 3/4 pessary was removed. Speculum exam did not reveal any areas of active bleeding, erosions, or granulation tissue. Pessary was cleaned and replaced back.  - Significant time spent with pt discussing pros and cons of pursuing definitive surgical management of her POP. She is clearly undecided and needs to think about this more.  - Also reviewed possible surgical options in detail. She had previously declined a colpocleisis.  - Discussed PREMIER trial.     2. Ovarian cyst  - She has a stable 2.7 x 3.8 cm ovarian cyst identified on a CT scan in December. The cyst is being monitored for changes.  - Will order repeat U/S to assess the cyst's status.  - If the cyst enlarges, consider surgical intervention, which may coincide with prolapse surgery.     3. Vaginal atrophy  - She has low estrogen levels affecting the urethra and a small urethral caruncle.  - Continue using tv estrogen twice weekly.     Follow-up with Kimberleemichelle Norrisersic in March for pessary check and in late April for U/S results and further discussion on surgical options.     Needs repeat imaging with pessary out    Normal pessary check 3/2025 with CS    Prolapse Symptoms:  - Considering surgery for her prolapse.          Past Medical History:      Medical History[1]       Past Surgical History:       Surgical History[2]      Medications:       Prior to Admission medications    Medication Sig Start Date End Date Taking? Authorizing Provider   atorvastatin (Lipitor) 20 mg tablet Take 1 tablet (20 mg) by mouth once daily at bedtime.  Patient not taking: Reported on 3/4/2025 7/11/24   Historical Provider, MD   cholecalciferol (Vitamin D-3) 25 MCG (1000 UT) tablet 1 tablet (25 mcg). 1/17/23    "Historical Provider, MD   estrogens, conjugated, (Premarin) vaginal cream Insert 1/2 gram into the vagina twice weekly at night. 3/4/25   Kimberlee Bridges, APRN-CNP        ROS  Review of Systems     POC Blood, Urine   Date Value Ref Range Status   12/16/2024 TRACE-Intact (A) NEGATIVE Final     Poc Nitrite, Urine   Date Value Ref Range Status   12/16/2024 NEGATIVE NEGATIVE Final     POC Urobilinogen, Urine   Date Value Ref Range Status   12/16/2024 0.2 0.2, 1.0 EU/DL Final     POC Leukocytes, Urine   Date Value Ref Range Status   12/16/2024 TRACE (A) NEGATIVE Final         PHYSICAL EXAM:      There were no vitals taken for this visit.     No LMP recorded. Patient is postmenopausal.      Declines chaperone for physical exam.      Well developed, well nourished, in no apparent distress.   Neurologic/Psychiatric:  Awake, Alert and Oriented times 3.  Affect normal.       Data and DIAGNOSTIC STUDIES REVIEWED   Imaging  No results found.    Cardiology, Vascular, and Other Imaging  No other imaging results found for the past 7 days     No results found for: \"URINECULTURE\", \"UAMICCOMM\"   No results found for: \"GLUCOSE\", \"CALCIUM\", \"NA\", \"K\", \"CO2\", \"CL\", \"BUN\", \"CREATININE\"No results found for: \"WBC\", \"HGB\", \"HCT\", \"MCV\", \"PLT\"       Miladys De Souza MD             [1]   Past Medical History:  Diagnosis Date    Colitis 09/06/2024   [2] No past surgical history on file.    "

## 2025-06-02 ENCOUNTER — HOSPITAL ENCOUNTER (OUTPATIENT)
Dept: RADIOLOGY | Facility: HOSPITAL | Age: 70
Discharge: HOME | End: 2025-06-02
Payer: MEDICARE

## 2025-06-02 ENCOUNTER — APPOINTMENT (OUTPATIENT)
Dept: OBSTETRICS AND GYNECOLOGY | Facility: CLINIC | Age: 70
End: 2025-06-02
Payer: MEDICARE

## 2025-06-02 ENCOUNTER — OFFICE VISIT (OUTPATIENT)
Dept: OBSTETRICS AND GYNECOLOGY | Facility: CLINIC | Age: 70
End: 2025-06-02
Payer: MEDICARE

## 2025-06-02 VITALS
HEART RATE: 67 BPM | SYSTOLIC BLOOD PRESSURE: 143 MMHG | DIASTOLIC BLOOD PRESSURE: 80 MMHG | BODY MASS INDEX: 30.07 KG/M2 | WEIGHT: 191.6 LBS | HEIGHT: 67 IN

## 2025-06-02 DIAGNOSIS — N83.209 CYST OF OVARY, UNSPECIFIED LATERALITY: ICD-10-CM

## 2025-06-02 DIAGNOSIS — N81.4 CYSTOCELE WITH UTERINE PROLAPSE: Primary | ICD-10-CM

## 2025-06-02 PROCEDURE — 99214 OFFICE O/P EST MOD 30 MIN: CPT | Performed by: OBSTETRICS & GYNECOLOGY

## 2025-06-02 PROCEDURE — 3008F BODY MASS INDEX DOCD: CPT | Performed by: OBSTETRICS & GYNECOLOGY

## 2025-06-02 PROCEDURE — 1159F MED LIST DOCD IN RCRD: CPT | Performed by: OBSTETRICS & GYNECOLOGY

## 2025-06-02 PROCEDURE — 76856 US EXAM PELVIC COMPLETE: CPT | Performed by: RADIOLOGY

## 2025-06-02 PROCEDURE — 76856 US EXAM PELVIC COMPLETE: CPT

## 2025-06-02 PROCEDURE — 76830 TRANSVAGINAL US NON-OB: CPT | Performed by: RADIOLOGY

## 2025-06-02 PROCEDURE — 1126F AMNT PAIN NOTED NONE PRSNT: CPT | Performed by: OBSTETRICS & GYNECOLOGY

## 2025-06-02 ASSESSMENT — PAIN SCALES - GENERAL: PAINLEVEL_OUTOF10: 0-NO PAIN

## 2025-06-02 ASSESSMENT — ENCOUNTER SYMPTOMS
OCCASIONAL FEELINGS OF UNSTEADINESS: 0
DEPRESSION: 0
LOSS OF SENSATION IN FEET: 0

## 2025-06-02 NOTE — PROGRESS NOTES
Urogynecology  Provider:  Miladys De Souza MD  [phone]              ASSESSMENT AND PLAN:     Problem List Items Addressed This Visit    None          I spent a total of {eConsult Time:74209} in face to face and non face to face time.        Miladys De Souza MD        HISTORY OF PRESENT ILLNESS:     Here to have pessary replaced     Record Review:     Prolapse Symptoms :     Urinary Symptoms:     Bowel Symptoms:     Sexual Activity:          Past Medical History:      Medical History[1]       Past Surgical History:       Surgical History[2]      Medications:       Prior to Admission medications    Medication Sig Start Date End Date Taking? Authorizing Provider   atorvastatin (Lipitor) 20 mg tablet Take 1 tablet (20 mg) by mouth once daily at bedtime.  Patient not taking: Reported on 3/4/2025 7/11/24   Historical Provider, MD   cholecalciferol (Vitamin D-3) 25 MCG (1000 UT) tablet 1 tablet (25 mcg). 1/17/23   Historical Provider, MD   estrogens, conjugated, (Premarin) vaginal cream Insert 1/2 gram into the vagina twice weekly at night. 3/4/25   Kimberlee Bridges, APRN-CNP        ROS  Review of Systems     POC Blood, Urine   Date Value Ref Range Status   12/16/2024 TRACE-Intact (A) NEGATIVE Final     Poc Nitrite, Urine   Date Value Ref Range Status   12/16/2024 NEGATIVE NEGATIVE Final     POC Urobilinogen, Urine   Date Value Ref Range Status   12/16/2024 0.2 0.2, 1.0 EU/DL Final     POC Leukocytes, Urine   Date Value Ref Range Status   12/16/2024 TRACE (A) NEGATIVE Final         PHYSICAL EXAM:      There were no vitals taken for this visit.     No LMP recorded. Patient is postmenopausal.      Declines chaperone for physical exam.      Well developed, well nourished, in no apparent distress.   Neurologic/Psychiatric:  Awake, Alert and Oriented times 3.  Affect normal.     GENITAL/URINARY:       External Genitalia:  The patient has normal appearing external genitalia, normal skenes and bartholins glands,  "and a normal hair distribution.  Her vulva is without lesions, erythema or discharge.  It is non-tender with appropriate sensation.     Urethral Meatus:  Size normal, Location normal, Lesions absent, Prolapse absent,      Urethra:  Fullness absent, Masses absent,      Bladder:  Fullness absent, Masses absent, Tenderness absent,      Vagina:  General appearance normal, Estrogen effect normal, Discharge absent, Lesions absent, ***     Cervix: Normal, no discharge.   Uterus:  {UTERUS, EXAM:52547}  Adnexa: {Adnexa:18770}     Anus/Perineum:  Lesions absent and Masses absent {Exam; anus:51908}  {Exam; anus and perineum:10931}    Stress urinary incontinence *** demonstrable.         POP-Q:  Stage: {NUMBERS 0-4:29802}  Position: {SITTING STANDIN}    Aa: ***       Ba: *** C: ***   Gh: *** Pb: *** TVL: ***         Ap: *** Bp: *** D: ***               Data and DIAGNOSTIC STUDIES REVIEWED   Imaging  No results found.    Cardiology, Vascular, and Other Imaging  No other imaging results found for the past 7 days     No results found for: \"URINECULTURE\", \"UAMICCOMM\"   No results found for: \"GLUCOSE\", \"CALCIUM\", \"NA\", \"K\", \"CO2\", \"CL\", \"BUN\", \"CREATININE\"No results found for: \"WBC\", \"HGB\", \"HCT\", \"MCV\", \"PLT\"       Miladys De Souza MD               [1]  Past Medical History:  Diagnosis Date   • Colitis 2024   [2]  No past surgical history on file.  "

## 2025-06-02 NOTE — PROGRESS NOTES
Urogynecology  Provider:  Miladys De Souza MD  [phone]    ASSESSMENT AND PLAN:   69 year old female with a stage 3-4 anterior wall predominant uterovaginal prolapse that is managed with a pessary presenting for pessary removal to have a pelvic US today. Comorbidities include: Mohs defect.      Diagnoses:  #1 Uterovaginal prolapse with predominant cystocele, stage 3-4  #2 Pessary reinsertion   #3 Ovarian cyst    Plan:  1. Uterovaginal prolapse, pessary reinsertion   - The #2 3/4 LS Gellhorn pessary was removed. Speculum exam did not reveal any areas of active bleeding, erosions, or granulation tissue. Pessary was left out.   > The pessary was left out due to her having a pelvic U/S scheduled for today.   - Patient returned to clinic today to have her Gellhorn pessary replaced back without any complications today after her pelvic U/S.     2. Ovarian cyst  - We will follow up to discuss the results from her pelvic US today at a later date.     Follow up in 3 months with Dr. De Souza for a pessary check visit and to discuss her pelvic U/S results.     Scribe Attestation  By signing my name below, I, Bettye Coronado, attest that this documentation has been prepared under the direction and in the presence of Miladys De Souza MD on 06/02/2025 at 8:07 PM.       Patient returned l;ater that afternoon to have pessary replaced.  She complained of some discomfort at the introitus. On spec exam vagina was normal with no erosions, but slight redness at right introitus, 2 o'clock position which was likely site of discomfort. Pessary cleaned and replaced without issue.  I will follow up with her on ultrsound result.  Agree with above. I Dr. De Souza, personally performed the services described in the documentation which was scribed virtually and confirm it is both complete and accurate.  Miladys De Souza MD        Problem List Items Addressed This Visit    None          I spent a total of eConsult Time: 35 minutes  "in face to face and non face to face time.        Miladys De Souza MD        HISTORY OF PRESENT ILLNESS:   69 year old female here for pessary removal so she can get a pelvic ultrasound today.     Pessary Maintenance:  - Last visit: 5/12/2025  - Pessary size: #2 3/4 LS Gellhorn pessary in place for uterovaginal prolapse  - Bleeding? None  - Discomfort? Denies     Patient returned to clinic today to have her Gellhorn pessary replaced back without any complications today after her pelvic U/S.       Past Medical History:    Medical History[1]       Past Surgical History:     Surgical History[2]      Medications:     Prior to Admission medications    Medication Sig Start Date End Date Taking? Authorizing Provider   atorvastatin (Lipitor) 20 mg tablet Take 1 tablet (20 mg) by mouth once daily at bedtime.  Patient not taking: Reported on 3/4/2025 7/11/24   Historical Provider, MD   cholecalciferol (Vitamin D-3) 25 MCG (1000 UT) tablet 1 tablet (25 mcg). 1/17/23   Historical Provider, MD   estrogens, conjugated, (Premarin) vaginal cream Insert 1/2 gram into the vagina twice weekly at night. 3/4/25   Kimberlee Bridges, APRN-CNP        ROS  Review of Systems     POC Blood, Urine   Date Value Ref Range Status   12/16/2024 TRACE-Intact (A) NEGATIVE Final     Poc Nitrite, Urine   Date Value Ref Range Status   12/16/2024 NEGATIVE NEGATIVE Final     POC Urobilinogen, Urine   Date Value Ref Range Status   12/16/2024 0.2 0.2, 1.0 EU/DL Final     POC Leukocytes, Urine   Date Value Ref Range Status   12/16/2024 TRACE (A) NEGATIVE Final         PHYSICAL EXAM:    /80 (Patient Position: Sitting)   Pulse 67   Ht 1.702 m (5' 7\")   Wt 86.9 kg (191 lb 9.6 oz)   BMI 30.01 kg/m²   No LMP recorded. Patient is postmenopausal.      Declines chaperone for physical exam.      Well developed, well nourished, in no apparent distress.   Neurologic/Psychiatric:  Awake, Alert and Oriented times 3.  Affect normal.     Pelvic exam: " "  Speculum examination: The vagina and cervix were inspected and were free of erosions. No blood in vaginal vault. Normal discharge appreciated.  There is a small amount of redness at the introitus on the right side.   Bimanual exam: The uterus was small and mobile.  There were no masses or tenderness in the pelvis/adnexal region.   The pessary was removed and left out.     She returned to clinic later today after her pelvic U/S and the pessary was cleaned, washed, and replaced back without any complications.         Data and DIAGNOSTIC STUDIES REVIEWED   Imaging  No results found.    Cardiology, Vascular, and Other Imaging  No other imaging results found for the past 7 days     No results found for: \"URINECULTURE\", \"[employer]\"   No results found for: \"GLUCOSE\", \"CALCIUM\", \"NA\", \"K\", \"CO2\", \"CL\", \"BUN\", \"CREATININE\"No results found for: \"WBC\", \"HGB\", \"HCT\", \"MCV\", \"PLT\"       Miladys De Souza MD             [1]   Past Medical History:  Diagnosis Date    Colitis 09/06/2024   [2] No past surgical history on file.    "

## 2025-06-03 ENCOUNTER — APPOINTMENT (OUTPATIENT)
Dept: OBSTETRICS AND GYNECOLOGY | Facility: CLINIC | Age: 70
End: 2025-06-03
Payer: MEDICARE

## 2025-06-04 ENCOUNTER — PREP FOR PROCEDURE (OUTPATIENT)
Dept: OBSTETRICS AND GYNECOLOGY | Facility: CLINIC | Age: 70
End: 2025-06-04
Payer: MEDICARE

## 2025-06-04 DIAGNOSIS — R93.89 THICKENED ENDOMETRIUM: Primary | ICD-10-CM

## 2025-06-04 RX ORDER — GABAPENTIN 600 MG/1
600 TABLET ORAL ONCE
OUTPATIENT
Start: 2025-06-04 | End: 2025-06-04

## 2025-06-04 RX ORDER — ACETAMINOPHEN 325 MG/1
975 TABLET ORAL ONCE
OUTPATIENT
Start: 2025-06-04 | End: 2025-06-04

## 2025-06-04 RX ORDER — CELECOXIB 400 MG/1
400 CAPSULE ORAL ONCE
OUTPATIENT
Start: 2025-06-04 | End: 2025-06-04

## 2025-06-12 PROBLEM — R93.89 THICKENED ENDOMETRIUM: Status: ACTIVE | Noted: 2025-06-04

## 2025-07-14 PROCEDURE — 80048 BASIC METABOLIC PNL TOTAL CA: CPT

## 2025-07-14 PROCEDURE — 85027 COMPLETE CBC AUTOMATED: CPT

## 2025-07-15 ENCOUNTER — APPOINTMENT (OUTPATIENT)
Dept: LAB | Facility: HOSPITAL | Age: 70
End: 2025-07-15
Payer: MEDICARE

## 2025-07-15 LAB
ANION GAP SERPL CALC-SCNC: 12 MMOL/L (ref 10–20)
BUN SERPL-MCNC: 13 MG/DL (ref 6–23)
CALCIUM SERPL-MCNC: 9.6 MG/DL (ref 8.6–10.3)
CHLORIDE SERPL-SCNC: 106 MMOL/L (ref 98–107)
CO2 SERPL-SCNC: 26 MMOL/L (ref 21–32)
CREAT SERPL-MCNC: 0.74 MG/DL (ref 0.5–1.05)
EGFRCR SERPLBLD CKD-EPI 2021: 86 ML/MIN/1.73M*2
ERYTHROCYTE [DISTWIDTH] IN BLOOD BY AUTOMATED COUNT: 12.1 % (ref 11.5–14.5)
GLUCOSE SERPL-MCNC: 91 MG/DL (ref 74–99)
HCT VFR BLD AUTO: 40.9 % (ref 36–46)
HGB BLD-MCNC: 13.8 G/DL (ref 12–16)
MCH RBC QN AUTO: 33.9 PG (ref 26–34)
MCHC RBC AUTO-ENTMCNC: 33.7 G/DL (ref 32–36)
MCV RBC AUTO: 101 FL (ref 80–100)
NRBC BLD-RTO: 0 /100 WBCS (ref 0–0)
PLATELET # BLD AUTO: 268 X10*3/UL (ref 150–450)
POTASSIUM SERPL-SCNC: 4.5 MMOL/L (ref 3.5–5.3)
RBC # BLD AUTO: 4.07 X10*6/UL (ref 4–5.2)
SODIUM SERPL-SCNC: 139 MMOL/L (ref 136–145)
WBC # BLD AUTO: 7.4 X10*3/UL (ref 4.4–11.3)

## 2025-08-12 ENCOUNTER — ANESTHESIA EVENT (OUTPATIENT)
Dept: OPERATING ROOM | Facility: CLINIC | Age: 70
End: 2025-08-12
Payer: MEDICARE

## 2025-08-12 PROBLEM — E66.811 OBESITY (BMI 30.0-34.9): Status: ACTIVE | Noted: 2025-08-12

## 2025-08-12 RX ORDER — IBUPROFEN 800 MG/1
800 TABLET, FILM COATED ORAL 3 TIMES DAILY PRN
Qty: 20 TABLET | Refills: 0 | Status: SHIPPED | OUTPATIENT
Start: 2025-08-12

## 2025-08-13 ENCOUNTER — ANESTHESIA (OUTPATIENT)
Dept: OPERATING ROOM | Facility: CLINIC | Age: 70
End: 2025-08-13
Payer: MEDICARE

## 2025-08-13 ENCOUNTER — HOSPITAL ENCOUNTER (OUTPATIENT)
Facility: CLINIC | Age: 70
Setting detail: OUTPATIENT SURGERY
Discharge: HOME | End: 2025-08-13
Attending: OBSTETRICS & GYNECOLOGY | Admitting: OBSTETRICS & GYNECOLOGY
Payer: MEDICARE

## 2025-08-13 VITALS
HEART RATE: 59 BPM | DIASTOLIC BLOOD PRESSURE: 81 MMHG | RESPIRATION RATE: 16 BRPM | HEIGHT: 67 IN | OXYGEN SATURATION: 96 % | WEIGHT: 189.6 LBS | TEMPERATURE: 96.8 F | SYSTOLIC BLOOD PRESSURE: 174 MMHG | BODY MASS INDEX: 29.76 KG/M2

## 2025-08-13 DIAGNOSIS — G89.18 POSTOPERATIVE PAIN: Primary | ICD-10-CM

## 2025-08-13 DIAGNOSIS — R93.89 THICKENED ENDOMETRIUM: ICD-10-CM

## 2025-08-13 PROCEDURE — 3600000007 HC OR TIME - EACH INCREMENTAL 1 MINUTE - PROCEDURE LEVEL TWO: Performed by: OBSTETRICS & GYNECOLOGY

## 2025-08-13 PROCEDURE — 2500000005 HC RX 250 GENERAL PHARMACY W/O HCPCS: Performed by: OBSTETRICS & GYNECOLOGY

## 2025-08-13 PROCEDURE — 88305 TISSUE EXAM BY PATHOLOGIST: CPT | Mod: TC,SUR | Performed by: OBSTETRICS & GYNECOLOGY

## 2025-08-13 PROCEDURE — 2500000001 HC RX 250 WO HCPCS SELF ADMINISTERED DRUGS (ALT 637 FOR MEDICARE OP): Performed by: STUDENT IN AN ORGANIZED HEALTH CARE EDUCATION/TRAINING PROGRAM

## 2025-08-13 PROCEDURE — 2500000004 HC RX 250 GENERAL PHARMACY W/ HCPCS (ALT 636 FOR OP/ED)

## 2025-08-13 PROCEDURE — 3600000002 HC OR TIME - INITIAL BASE CHARGE - PROCEDURE LEVEL TWO: Performed by: OBSTETRICS & GYNECOLOGY

## 2025-08-13 PROCEDURE — 7100000010 HC PHASE TWO TIME - EACH INCREMENTAL 1 MINUTE: Performed by: OBSTETRICS & GYNECOLOGY

## 2025-08-13 PROCEDURE — 2720000007 HC OR 272 NO HCPCS: Performed by: OBSTETRICS & GYNECOLOGY

## 2025-08-13 PROCEDURE — 3700000002 HC GENERAL ANESTHESIA TIME - EACH INCREMENTAL 1 MINUTE: Performed by: OBSTETRICS & GYNECOLOGY

## 2025-08-13 PROCEDURE — 58558 HYSTEROSCOPY BIOPSY: CPT | Performed by: OBSTETRICS & GYNECOLOGY

## 2025-08-13 PROCEDURE — 2500000001 HC RX 250 WO HCPCS SELF ADMINISTERED DRUGS (ALT 637 FOR MEDICARE OP): Performed by: OBSTETRICS & GYNECOLOGY

## 2025-08-13 PROCEDURE — 3700000001 HC GENERAL ANESTHESIA TIME - INITIAL BASE CHARGE: Performed by: OBSTETRICS & GYNECOLOGY

## 2025-08-13 PROCEDURE — 7100000009 HC PHASE TWO TIME - INITIAL BASE CHARGE: Performed by: OBSTETRICS & GYNECOLOGY

## 2025-08-13 RX ORDER — SODIUM CHLORIDE, SODIUM LACTATE, POTASSIUM CHLORIDE, CALCIUM CHLORIDE 600; 310; 30; 20 MG/100ML; MG/100ML; MG/100ML; MG/100ML
INJECTION, SOLUTION INTRAVENOUS CONTINUOUS PRN
Status: DISCONTINUED | OUTPATIENT
Start: 2025-08-13 | End: 2025-08-13

## 2025-08-13 RX ORDER — SODIUM CHLORIDE 0.9 G/100ML
INJECTION, SOLUTION IRRIGATION AS NEEDED
Status: DISCONTINUED | OUTPATIENT
Start: 2025-08-13 | End: 2025-08-13 | Stop reason: HOSPADM

## 2025-08-13 RX ORDER — FENTANYL CITRATE 50 UG/ML
INJECTION, SOLUTION INTRAMUSCULAR; INTRAVENOUS AS NEEDED
Status: DISCONTINUED | OUTPATIENT
Start: 2025-08-13 | End: 2025-08-13

## 2025-08-13 RX ORDER — MIDAZOLAM HYDROCHLORIDE 1 MG/ML
INJECTION, SOLUTION INTRAMUSCULAR; INTRAVENOUS AS NEEDED
Status: DISCONTINUED | OUTPATIENT
Start: 2025-08-13 | End: 2025-08-13

## 2025-08-13 RX ORDER — PROPOFOL 10 MG/ML
INJECTION, EMULSION INTRAVENOUS CONTINUOUS PRN
Status: DISCONTINUED | OUTPATIENT
Start: 2025-08-13 | End: 2025-08-13

## 2025-08-13 RX ORDER — OXYCODONE HYDROCHLORIDE 5 MG/1
10 TABLET ORAL EVERY 4 HOURS PRN
Status: DISCONTINUED | OUTPATIENT
Start: 2025-08-13 | End: 2025-08-13 | Stop reason: HOSPADM

## 2025-08-13 RX ORDER — CELECOXIB 200 MG/1
400 CAPSULE ORAL ONCE
Status: DISCONTINUED | OUTPATIENT
Start: 2025-08-13 | End: 2025-08-13 | Stop reason: HOSPADM

## 2025-08-13 RX ORDER — HYDROMORPHONE HYDROCHLORIDE 1 MG/ML
0.5 INJECTION, SOLUTION INTRAMUSCULAR; INTRAVENOUS; SUBCUTANEOUS EVERY 5 MIN PRN
Status: DISCONTINUED | OUTPATIENT
Start: 2025-08-13 | End: 2025-08-13 | Stop reason: HOSPADM

## 2025-08-13 RX ORDER — CEFAZOLIN 1 G/1
INJECTION, POWDER, FOR SOLUTION INTRAVENOUS AS NEEDED
Status: DISCONTINUED | OUTPATIENT
Start: 2025-08-13 | End: 2025-08-13

## 2025-08-13 RX ORDER — ACETAMINOPHEN 325 MG/1
650 TABLET ORAL EVERY 4 HOURS PRN
Status: DISCONTINUED | OUTPATIENT
Start: 2025-08-13 | End: 2025-08-13 | Stop reason: HOSPADM

## 2025-08-13 RX ORDER — ACETAMINOPHEN 325 MG/1
975 TABLET ORAL ONCE
Status: COMPLETED | OUTPATIENT
Start: 2025-08-13 | End: 2025-08-13

## 2025-08-13 RX ORDER — LIDOCAINE HYDROCHLORIDE 10 MG/ML
0.1 INJECTION, SOLUTION EPIDURAL; INFILTRATION; INTRACAUDAL; PERINEURAL ONCE
Status: DISCONTINUED | OUTPATIENT
Start: 2025-08-13 | End: 2025-08-13 | Stop reason: HOSPADM

## 2025-08-13 RX ORDER — GABAPENTIN 600 MG/1
600 TABLET ORAL ONCE
Status: COMPLETED | OUTPATIENT
Start: 2025-08-13 | End: 2025-08-13

## 2025-08-13 RX ORDER — HYDROMORPHONE HYDROCHLORIDE 1 MG/ML
0.25 INJECTION, SOLUTION INTRAMUSCULAR; INTRAVENOUS; SUBCUTANEOUS EVERY 5 MIN PRN
Status: DISCONTINUED | OUTPATIENT
Start: 2025-08-13 | End: 2025-08-13 | Stop reason: HOSPADM

## 2025-08-13 RX ORDER — OXYCODONE HYDROCHLORIDE 5 MG/1
5 TABLET ORAL EVERY 4 HOURS PRN
Status: DISCONTINUED | OUTPATIENT
Start: 2025-08-13 | End: 2025-08-13 | Stop reason: HOSPADM

## 2025-08-13 RX ADMIN — FENTANYL CITRATE 25 MCG: 50 INJECTION, SOLUTION INTRAMUSCULAR; INTRAVENOUS at 12:27

## 2025-08-13 RX ADMIN — CEFAZOLIN 2 G: 1 INJECTION, POWDER, FOR SOLUTION INTRAMUSCULAR; INTRAVENOUS at 12:13

## 2025-08-13 RX ADMIN — FENTANYL CITRATE 25 MCG: 50 INJECTION, SOLUTION INTRAMUSCULAR; INTRAVENOUS at 12:19

## 2025-08-13 RX ADMIN — PROPOFOL 200 MCG/KG/MIN: 10 INJECTION, EMULSION INTRAVENOUS at 12:10

## 2025-08-13 RX ADMIN — OXYCODONE HYDROCHLORIDE 5 MG: 5 TABLET ORAL at 13:15

## 2025-08-13 RX ADMIN — MIDAZOLAM 2 MG: 1 INJECTION INTRAMUSCULAR; INTRAVENOUS at 12:03

## 2025-08-13 RX ADMIN — FENTANYL CITRATE 50 MCG: 50 INJECTION, SOLUTION INTRAMUSCULAR; INTRAVENOUS at 12:08

## 2025-08-13 RX ADMIN — SODIUM CHLORIDE, POTASSIUM CHLORIDE, SODIUM LACTATE AND CALCIUM CHLORIDE: 600; 310; 30; 20 INJECTION, SOLUTION INTRAVENOUS at 12:07

## 2025-08-13 RX ADMIN — ACETAMINOPHEN 975 MG: 325 TABLET, FILM COATED ORAL at 11:50

## 2025-08-13 RX ADMIN — GABAPENTIN 600 MG: 600 TABLET, FILM COATED ORAL at 11:50

## 2025-08-13 ASSESSMENT — PAIN - FUNCTIONAL ASSESSMENT
PAIN_FUNCTIONAL_ASSESSMENT: 0-10

## 2025-08-13 ASSESSMENT — PAIN SCALES - GENERAL
PAINLEVEL_OUTOF10: 6
PAINLEVEL_OUTOF10: 0 - NO PAIN
PAINLEVEL_OUTOF10: 0 - NO PAIN
PAINLEVEL_OUTOF10: 3

## 2025-08-13 ASSESSMENT — PAIN DESCRIPTION - LOCATION: LOCATION: ABDOMEN

## 2025-08-13 ASSESSMENT — PAIN DESCRIPTION - DESCRIPTORS
DESCRIPTORS: CRAMPING
DESCRIPTORS: CRAMPING

## 2025-08-18 LAB
LABORATORY COMMENT REPORT: NORMAL
PATH REPORT.FINAL DX SPEC: NORMAL
PATH REPORT.GROSS SPEC: NORMAL
PATH REPORT.RELEVANT HX SPEC: NORMAL
PATH REPORT.TOTAL CANCER: NORMAL

## 2025-08-21 ENCOUNTER — OFFICE VISIT (OUTPATIENT)
Dept: OBSTETRICS AND GYNECOLOGY | Facility: CLINIC | Age: 70
End: 2025-08-21
Payer: MEDICARE

## 2025-08-21 VITALS — SYSTOLIC BLOOD PRESSURE: 159 MMHG | DIASTOLIC BLOOD PRESSURE: 90 MMHG | BODY MASS INDEX: 29.76 KG/M2 | WEIGHT: 190 LBS

## 2025-08-21 DIAGNOSIS — Z46.89 PESSARY MAINTENANCE: ICD-10-CM

## 2025-08-21 DIAGNOSIS — N81.4 CYSTOCELE WITH UTERINE PROLAPSE: Primary | ICD-10-CM

## 2025-08-21 DIAGNOSIS — R39.9 UTI SYMPTOMS: ICD-10-CM

## 2025-08-21 DIAGNOSIS — R31.29 HEMATURIA, MICROSCOPIC: ICD-10-CM

## 2025-08-21 PROCEDURE — 99212 OFFICE O/P EST SF 10 MIN: CPT | Performed by: NURSE PRACTITIONER

## 2025-08-21 PROCEDURE — 1125F AMNT PAIN NOTED PAIN PRSNT: CPT | Performed by: NURSE PRACTITIONER

## 2025-08-21 PROCEDURE — 81003 URINALYSIS AUTO W/O SCOPE: CPT | Mod: QW | Performed by: NURSE PRACTITIONER

## 2025-08-21 PROCEDURE — 1159F MED LIST DOCD IN RCRD: CPT | Performed by: NURSE PRACTITIONER

## 2025-08-21 RX ORDER — TRIAMCINOLONE ACETONIDE 0.25 MG/ML
1 LOTION TOPICAL 2 TIMES DAILY
COMMUNITY
Start: 2025-08-04

## 2025-08-21 ASSESSMENT — PAIN SCALES - GENERAL: PAINLEVEL_OUTOF10: 3

## 2025-08-24 LAB — BACTERIA UR CULT: ABNORMAL

## 2025-08-27 ENCOUNTER — APPOINTMENT (OUTPATIENT)
Dept: OBSTETRICS AND GYNECOLOGY | Facility: CLINIC | Age: 70
End: 2025-08-27
Payer: MEDICARE

## (undated) DEVICE — Device

## (undated) DEVICE — ACCESSORY, AVETA, WASTE MANAGEMENT

## (undated) DEVICE — GOWN, ASTOUND, XL

## (undated) DEVICE — GLOVE, SURGICAL, PROTEXIS PI , 6.5, PF, LF

## (undated) DEVICE — DRESSING, NON-ADHERENT, TELFA, OUCHLESS, 3 X 8 IN, STERILE

## (undated) DEVICE — DRAPE, PAD, PREP, W/ 9 IN CUFF, 24 X 41, LF, NS

## (undated) DEVICE — GLOVE, SURGICAL, PROTEXIS PI BLUE W/NEUTHERA, 7.0, PF, LF

## (undated) DEVICE — HOLSTER, JET SAFETY

## (undated) DEVICE — PAD, SANITARY, OBSTETRICAL, W/ADHSV STRIP,11 IN,LF

## (undated) DEVICE — ACCESSORY, FLUID MANAGEMENT, AVETA

## (undated) DEVICE — DEVICE, RESECTING, SMOL,  WITH HANDSET AVETA, 2.9MM

## (undated) DEVICE — COVER HANDLE LIGHT, STERIS, BLUE, STERILE

## (undated) DEVICE — BRIEF, CURITY, XLARGE, MESH